# Patient Record
Sex: FEMALE | Race: WHITE | HISPANIC OR LATINO
[De-identification: names, ages, dates, MRNs, and addresses within clinical notes are randomized per-mention and may not be internally consistent; named-entity substitution may affect disease eponyms.]

---

## 2018-12-11 ENCOUNTER — RESULT REVIEW (OUTPATIENT)
Age: 63
End: 2018-12-11

## 2019-11-07 ENCOUNTER — INPATIENT (INPATIENT)
Facility: HOSPITAL | Age: 64
LOS: 2 days | Discharge: HOME | DRG: 203 | End: 2019-11-10
Attending: INTERNAL MEDICINE | Admitting: INTERNAL MEDICINE
Payer: COMMERCIAL

## 2019-11-07 VITALS
DIASTOLIC BLOOD PRESSURE: 100 MMHG | OXYGEN SATURATION: 99 % | SYSTOLIC BLOOD PRESSURE: 176 MMHG | HEART RATE: 74 BPM | HEIGHT: 60 IN | RESPIRATION RATE: 15 BRPM | TEMPERATURE: 97 F | WEIGHT: 162.92 LBS

## 2019-11-07 DIAGNOSIS — I10 ESSENTIAL (PRIMARY) HYPERTENSION: ICD-10-CM

## 2019-11-07 DIAGNOSIS — J45.901 UNSPECIFIED ASTHMA WITH (ACUTE) EXACERBATION: ICD-10-CM

## 2019-11-07 DIAGNOSIS — K21.9 GASTRO-ESOPHAGEAL REFLUX DISEASE WITHOUT ESOPHAGITIS: ICD-10-CM

## 2019-11-07 DIAGNOSIS — Z29.9 ENCOUNTER FOR PROPHYLACTIC MEASURES, UNSPECIFIED: ICD-10-CM

## 2019-11-07 DIAGNOSIS — E03.9 HYPOTHYROIDISM, UNSPECIFIED: ICD-10-CM

## 2019-11-07 LAB
ALBUMIN SERPL ELPH-MCNC: 4.2 G/DL — SIGNIFICANT CHANGE UP (ref 3.3–5)
ALP SERPL-CCNC: 85 U/L — SIGNIFICANT CHANGE UP (ref 40–120)
ALT FLD-CCNC: 36 U/L — SIGNIFICANT CHANGE UP (ref 12–78)
ANION GAP SERPL CALC-SCNC: 6 MMOL/L — SIGNIFICANT CHANGE UP (ref 5–17)
AST SERPL-CCNC: 26 U/L — SIGNIFICANT CHANGE UP (ref 15–37)
BASOPHILS # BLD AUTO: 0.06 K/UL — SIGNIFICANT CHANGE UP (ref 0–0.2)
BASOPHILS NFR BLD AUTO: 0.6 % — SIGNIFICANT CHANGE UP (ref 0–2)
BILIRUB SERPL-MCNC: 0.3 MG/DL — SIGNIFICANT CHANGE UP (ref 0.2–1.2)
BUN SERPL-MCNC: 19 MG/DL — SIGNIFICANT CHANGE UP (ref 7–23)
CALCIUM SERPL-MCNC: 9.4 MG/DL — SIGNIFICANT CHANGE UP (ref 8.5–10.1)
CHLORIDE SERPL-SCNC: 105 MMOL/L — SIGNIFICANT CHANGE UP (ref 96–108)
CO2 SERPL-SCNC: 28 MMOL/L — SIGNIFICANT CHANGE UP (ref 22–31)
CREAT SERPL-MCNC: 0.93 MG/DL — SIGNIFICANT CHANGE UP (ref 0.5–1.3)
EOSINOPHIL # BLD AUTO: 0.08 K/UL — SIGNIFICANT CHANGE UP (ref 0–0.5)
EOSINOPHIL NFR BLD AUTO: 0.8 % — SIGNIFICANT CHANGE UP (ref 0–6)
GLUCOSE SERPL-MCNC: 94 MG/DL — SIGNIFICANT CHANGE UP (ref 70–99)
HCT VFR BLD CALC: 39.4 % — SIGNIFICANT CHANGE UP (ref 34.5–45)
HGB BLD-MCNC: 12.9 G/DL — SIGNIFICANT CHANGE UP (ref 11.5–15.5)
IMM GRANULOCYTES NFR BLD AUTO: 0.4 % — SIGNIFICANT CHANGE UP (ref 0–1.5)
LYMPHOCYTES # BLD AUTO: 2.82 K/UL — SIGNIFICANT CHANGE UP (ref 1–3.3)
LYMPHOCYTES # BLD AUTO: 29 % — SIGNIFICANT CHANGE UP (ref 13–44)
MCHC RBC-ENTMCNC: 30.1 PG — SIGNIFICANT CHANGE UP (ref 27–34)
MCHC RBC-ENTMCNC: 32.7 GM/DL — SIGNIFICANT CHANGE UP (ref 32–36)
MCV RBC AUTO: 92.1 FL — SIGNIFICANT CHANGE UP (ref 80–100)
MONOCYTES # BLD AUTO: 0.76 K/UL — SIGNIFICANT CHANGE UP (ref 0–0.9)
MONOCYTES NFR BLD AUTO: 7.8 % — SIGNIFICANT CHANGE UP (ref 2–14)
NEUTROPHILS # BLD AUTO: 5.95 K/UL — SIGNIFICANT CHANGE UP (ref 1.8–7.4)
NEUTROPHILS NFR BLD AUTO: 61.4 % — SIGNIFICANT CHANGE UP (ref 43–77)
NRBC # BLD: 0 /100 WBCS — SIGNIFICANT CHANGE UP (ref 0–0)
PLATELET # BLD AUTO: 313 K/UL — SIGNIFICANT CHANGE UP (ref 150–400)
POTASSIUM SERPL-MCNC: 3.7 MMOL/L — SIGNIFICANT CHANGE UP (ref 3.5–5.3)
POTASSIUM SERPL-SCNC: 3.7 MMOL/L — SIGNIFICANT CHANGE UP (ref 3.5–5.3)
PROT SERPL-MCNC: 8.4 G/DL — HIGH (ref 6–8.3)
RBC # BLD: 4.28 M/UL — SIGNIFICANT CHANGE UP (ref 3.8–5.2)
RBC # FLD: 12.1 % — SIGNIFICANT CHANGE UP (ref 10.3–14.5)
SODIUM SERPL-SCNC: 139 MMOL/L — SIGNIFICANT CHANGE UP (ref 135–145)
WBC # BLD: 9.71 K/UL — SIGNIFICANT CHANGE UP (ref 3.8–10.5)
WBC # FLD AUTO: 9.71 K/UL — SIGNIFICANT CHANGE UP (ref 3.8–10.5)

## 2019-11-07 PROCEDURE — 71046 X-RAY EXAM CHEST 2 VIEWS: CPT | Mod: 26

## 2019-11-07 PROCEDURE — 93010 ELECTROCARDIOGRAM REPORT: CPT

## 2019-11-07 PROCEDURE — 99285 EMERGENCY DEPT VISIT HI MDM: CPT

## 2019-11-07 RX ORDER — ENOXAPARIN SODIUM 100 MG/ML
40 INJECTION SUBCUTANEOUS DAILY
Refills: 0 | Status: DISCONTINUED | OUTPATIENT
Start: 2019-11-07 | End: 2019-11-10

## 2019-11-07 RX ORDER — HYDROCHLOROTHIAZIDE 25 MG
12.5 TABLET ORAL DAILY
Refills: 0 | Status: DISCONTINUED | OUTPATIENT
Start: 2019-11-07 | End: 2019-11-10

## 2019-11-07 RX ORDER — BUDESONIDE AND FORMOTEROL FUMARATE DIHYDRATE 160; 4.5 UG/1; UG/1
2 AEROSOL RESPIRATORY (INHALATION)
Refills: 0 | Status: DISCONTINUED | OUTPATIENT
Start: 2019-11-07 | End: 2019-11-10

## 2019-11-07 RX ORDER — MONTELUKAST 4 MG/1
1 TABLET, CHEWABLE ORAL
Qty: 0 | Refills: 0 | DISCHARGE

## 2019-11-07 RX ORDER — ALPRAZOLAM 0.25 MG
0 TABLET ORAL
Qty: 0 | Refills: 0 | DISCHARGE

## 2019-11-07 RX ORDER — MAGNESIUM SULFATE 500 MG/ML
2 VIAL (ML) INJECTION ONCE
Refills: 0 | Status: COMPLETED | OUTPATIENT
Start: 2019-11-07 | End: 2019-11-07

## 2019-11-07 RX ORDER — BUDESONIDE AND FORMOTEROL FUMARATE DIHYDRATE 160; 4.5 UG/1; UG/1
2 AEROSOL RESPIRATORY (INHALATION)
Qty: 0 | Refills: 0 | DISCHARGE

## 2019-11-07 RX ORDER — SODIUM CHLORIDE 9 MG/ML
1000 INJECTION INTRAMUSCULAR; INTRAVENOUS; SUBCUTANEOUS ONCE
Refills: 0 | Status: COMPLETED | OUTPATIENT
Start: 2019-11-07 | End: 2019-11-07

## 2019-11-07 RX ORDER — IPRATROPIUM/ALBUTEROL SULFATE 18-103MCG
3 AEROSOL WITH ADAPTER (GRAM) INHALATION ONCE
Refills: 0 | Status: COMPLETED | OUTPATIENT
Start: 2019-11-07 | End: 2019-11-07

## 2019-11-07 RX ORDER — MONTELUKAST 4 MG/1
10 TABLET, CHEWABLE ORAL DAILY
Refills: 0 | Status: DISCONTINUED | OUTPATIENT
Start: 2019-11-07 | End: 2019-11-10

## 2019-11-07 RX ORDER — LEVOTHYROXINE SODIUM 125 MCG
75 TABLET ORAL DAILY
Refills: 0 | Status: DISCONTINUED | OUTPATIENT
Start: 2019-11-07 | End: 2019-11-10

## 2019-11-07 RX ORDER — LISINOPRIL 2.5 MG/1
20 TABLET ORAL DAILY
Refills: 0 | Status: DISCONTINUED | OUTPATIENT
Start: 2019-11-07 | End: 2019-11-10

## 2019-11-07 RX ORDER — ALBUTEROL 90 UG/1
1 AEROSOL, METERED ORAL THREE TIMES A DAY
Refills: 0 | Status: DISCONTINUED | OUTPATIENT
Start: 2019-11-07 | End: 2019-11-10

## 2019-11-07 RX ORDER — BENAZEPRIL HYDROCHLORIDE 40 MG/1
1 TABLET ORAL
Qty: 0 | Refills: 0 | DISCHARGE

## 2019-11-07 RX ORDER — HYDROCHLOROTHIAZIDE 25 MG
12.5 TABLET ORAL DAILY
Refills: 0 | Status: DISCONTINUED | OUTPATIENT
Start: 2019-11-07 | End: 2019-11-07

## 2019-11-07 RX ADMIN — SODIUM CHLORIDE 1000 MILLILITER(S): 9 INJECTION INTRAMUSCULAR; INTRAVENOUS; SUBCUTANEOUS at 17:20

## 2019-11-07 RX ADMIN — Medication 104 MILLIGRAM(S): at 15:46

## 2019-11-07 RX ADMIN — Medication 3 MILLILITER(S): at 16:20

## 2019-11-07 RX ADMIN — SODIUM CHLORIDE 1000 MILLILITER(S): 9 INJECTION INTRAMUSCULAR; INTRAVENOUS; SUBCUTANEOUS at 15:45

## 2019-11-07 RX ADMIN — Medication 3 MILLILITER(S): at 16:40

## 2019-11-07 RX ADMIN — Medication 12.5 MILLIGRAM(S): at 19:51

## 2019-11-07 RX ADMIN — Medication 3 MILLILITER(S): at 15:45

## 2019-11-07 RX ADMIN — Medication 125 MILLIGRAM(S): at 17:20

## 2019-11-07 RX ADMIN — Medication 50 GRAM(S): at 17:35

## 2019-11-07 NOTE — ED PROVIDER NOTE - ATTENDING CONTRIBUTION TO CARE
63yo F with ho asthma with 2 weeks worsening asthma exacerbation. worse in last few days. has been taking nebulizers with some improvement but still feels very sob. was seen by pmd mojgan today given tx and told to go to ED  on exam diminished breath sounds, very tight, end dennis wheezing  no obv distress  will treat asthma exacerbation, nebs, steroids, reassess

## 2019-11-07 NOTE — H&P ADULT - PROBLEM SELECTOR PLAN 1
Admit to medicine  Pt presents with GONG secondary to acute asthma exacerbation.   Pt restarted on home dose symbicort, Proair, montelukast  Albuterol q6 while awake, nebs q3 PRN  Solumedrol IV Q6 for management of symptoms   F/u AM CBC, BMP

## 2019-11-07 NOTE — ED PROVIDER NOTE - CLINICAL SUMMARY MEDICAL DECISION MAKING FREE TEXT BOX
63 y/o female hx asthma presents for exacerbation not responding to home tx. sent in by pmd. steroids, duonebs, mg+ and likely admit

## 2019-11-07 NOTE — H&P ADULT - NSICDXPASTMEDICALHX_GEN_ALL_CORE_FT
PAST MEDICAL HISTORY:  Asthma     GERD (gastroesophageal reflux disease)     Hypothyroid PAST MEDICAL HISTORY:  Asthma     GERD (gastroesophageal reflux disease)     HTN (hypertension)     Hypothyroid

## 2019-11-07 NOTE — H&P ADULT - NSHPPHYSICALEXAM_GEN_ALL_CORE
T(C): 36.2 (11-07-19 @ 14:08), Max: 36.2 (11-07-19 @ 14:08)  HR: 74 (11-07-19 @ 14:08) (74 - 74)  BP: 176/100 (11-07-19 @ 14:08) (176/100 - 176/100)  RR: 15 (11-07-19 @ 14:08) (15 - 15)  SpO2: 99% (11-07-19 @ 14:08) (99% - 99%)    GENERAL: patient appears well, no acute distress, appropriate  EYES: sclera clear, no exudates, PERRLA  ENMT: oropharynx clear without erythema, no exudates, moist mucous membranes  NECK: supple, soft, no thyromegaly noted  LUNGS:  clear to auscultation,  no rales, wheezing or rhonchi appreciated  HEART: S1/S2, regular rate and rhythm, no murmurs noted, no lower extremity edema, pulses  GASTROINTESTINAL: abdomen is soft, nontender, nondistended, normoactive bowel sounds, no palpable masses  INTEGUMENT: good skin turgor, warm, dry and intact  MUSCULOSKELETAL: no clubbing or cyanosis, no obvious deformity  NEUROLOGIC: awake, alert, oriented x3, strength no obvious sensory deficits  PSYCHIATRIC: mood is good, affect is congruent, linear and logical thought process  HEME/LYMPH:  no obvious ecchymosis or petechiae T(C): 36.2 (11-07-19 @ 14:08), Max: 36.2 (11-07-19 @ 14:08)  HR: 74 (11-07-19 @ 14:08) (74 - 74)  BP: 176/100 (11-07-19 @ 14:08) (176/100 - 176/100)  RR: 15 (11-07-19 @ 14:08) (15 - 15)  SpO2: 99% (11-07-19 @ 14:08) (99% - 99%)    GENERAL: patient appears well, no acute distress, appropriate  EYES: sclera clear, no exudates, PERRLA  ENMT: oropharynx clear without erythema, no exudates, moist mucous membranes  LUNGS:  clear to auscultation,  no rales, wheezing or rhonchi appreciated  HEART: S1/S2, regular rate and rhythm, no murmurs noted, no lower extremity edema  GASTROINTESTINAL: abdomen is soft, nontender, nondistended, normoactive bowel sounds, no palpable masses  INTEGUMENT: good skin turgor, warm, dry and intact  MUSCULOSKELETAL: no clubbing or cyanosis, no obvious deformity  NEUROLOGIC: awake, alert, oriented x3, strength 5/5 in all extremities, no obvious sensory deficits

## 2019-11-07 NOTE — ED ADULT NURSE NOTE - OBJECTIVE STATEMENT
Pt received in bed alert and oriented and resting with the c/o SOB and wheezing that started on 2 weeks but got progressively worse. As per Md's orders IV sylvia placed blood specimen obtained and sent to the lab and meds given and tolerated well. Pt stable with IVF infusing well and nursing care ongoing and safety maintained.

## 2019-11-07 NOTE — ED PROVIDER NOTE - PROGRESS NOTE DETAILS
patient reassessed following 3 duonebs, does not feel better, still not moving air well on auscultation. Mg and admit - Sherry Goel PA-C

## 2019-11-07 NOTE — H&P ADULT - NSICDXFAMILYHX_GEN_ALL_CORE_FT
FAMILY HISTORY:  Sibling  Still living? Unknown  Family history of asthma, Age at diagnosis: Age Unknown  Family history of atrial fibrillation, Age at diagnosis: Age Unknown  Family history of stomach cancer, Age at diagnosis: Age Unknown

## 2019-11-07 NOTE — H&P ADULT - NSHPREVIEWOFSYSTEMS_GEN_ALL_CORE
CONSTITUTIONAL: denies fever, chills, fatigue, weakness  HEENT: denies blurred vision, sore throat  SKIN: denies new lesions, rash  CARDIOVASCULAR: denies chest pain, chest pressure, palpitations  RESPIRATORY: denies shortness of breath, sputum production  GASTROINTESTINAL: denies nausea, vomiting, diarrhea, abdominal pain  GENITOURINARY: denies dysuria, discharge  NEUROLOGICAL: denies numbness, headache, focal weakness  MUSCULOSKELETAL: denies new joint pain, muscle aches  HEMATOLOGIC: denies gross bleeding, bruising  LYMPHATICS: denies enlarged lymph nodes, extremity swelling  PSYCHIATRIC: denies recent changes in anxiety, depression  ENDOCRINOLOGIC: denies sweating, cold or heat intolerance CONSTITUTIONAL: denies fever, chills, fatigue, weakness  HEENT: denies blurred vision, sore throat  SKIN: denies new lesions, rash  CARDIOVASCULAR: denies chest pain, chest pressure, palpitations  RESPIRATORY: admits to shortness of breath, denies sputum production  GASTROINTESTINAL: denies nausea, vomiting, diarrhea, abdominal pain  GENITOURINARY: denies dysuria, discharge  NEUROLOGICAL: denies numbness, headache, focal weakness  MUSCULOSKELETAL: denies new joint pain, muscle aches  HEMATOLOGIC: denies gross bleeding, bruising  LYMPHATICS: denies enlarged lymph nodes, extremity swelling  PSYCHIATRIC: denies recent changes in anxiety, depression  ENDOCRINOLOGIC: denies sweating, cold or heat intolerance

## 2019-11-07 NOTE — H&P ADULT - ASSESSMENT
65 y/o female PMHx asthma, GERD, hypothyroidism presents to the ED for asthma exacerbation x 2 weeks. 63 y/o female PMHx asthma, GERD, hypothyroidism, HTN  presents to the ED for asthma exacerbation x 2 weeks.

## 2019-11-07 NOTE — ED PROVIDER NOTE - OBJECTIVE STATEMENT
63 y/o female hx asthma presents to the ED for asthma exacerbation x 2 weeks. using symbicort and rescue inhaler without relief. no fever/chills/productive cough. denies chest pain aside from her typical tightness associated with asthma. many hospitalization for asthma but never been intubated. no sick contacts.

## 2019-11-07 NOTE — H&P ADULT - NSHPSOCIALHISTORY_GEN_ALL_CORE
Lives at home with  and daughter  Never smoker, never drinker, no drug use  ambulates without assistance

## 2019-11-08 LAB
ANION GAP SERPL CALC-SCNC: 6 MMOL/L — SIGNIFICANT CHANGE UP (ref 5–17)
BUN SERPL-MCNC: 17 MG/DL — SIGNIFICANT CHANGE UP (ref 7–23)
CALCIUM SERPL-MCNC: 9 MG/DL — SIGNIFICANT CHANGE UP (ref 8.5–10.1)
CHLORIDE SERPL-SCNC: 107 MMOL/L — SIGNIFICANT CHANGE UP (ref 96–108)
CO2 SERPL-SCNC: 26 MMOL/L — SIGNIFICANT CHANGE UP (ref 22–31)
CREAT SERPL-MCNC: 0.83 MG/DL — SIGNIFICANT CHANGE UP (ref 0.5–1.3)
CRP SERPL-MCNC: 0.23 MG/DL — SIGNIFICANT CHANGE UP (ref 0–0.4)
EOSINOPHIL # BLD: 10 /UL — LOW (ref 50–350)
GLUCOSE SERPL-MCNC: 121 MG/DL — HIGH (ref 70–99)
HBA1C BLD-MCNC: 6 % — HIGH (ref 4–5.6)
HCT VFR BLD CALC: 36.2 % — SIGNIFICANT CHANGE UP (ref 34.5–45)
HCV AB S/CO SERPL IA: 0.13 S/CO — SIGNIFICANT CHANGE UP (ref 0–0.99)
HCV AB SERPL-IMP: SIGNIFICANT CHANGE UP
HGB BLD-MCNC: 12.2 G/DL — SIGNIFICANT CHANGE UP (ref 11.5–15.5)
MCHC RBC-ENTMCNC: 30.7 PG — SIGNIFICANT CHANGE UP (ref 27–34)
MCHC RBC-ENTMCNC: 33.7 GM/DL — SIGNIFICANT CHANGE UP (ref 32–36)
MCV RBC AUTO: 91 FL — SIGNIFICANT CHANGE UP (ref 80–100)
NRBC # BLD: 0 /100 WBCS — SIGNIFICANT CHANGE UP (ref 0–0)
NT-PROBNP SERPL-SCNC: 133 PG/ML — HIGH (ref 0–125)
PLATELET # BLD AUTO: 334 K/UL — SIGNIFICANT CHANGE UP (ref 150–400)
POTASSIUM SERPL-MCNC: 3.9 MMOL/L — SIGNIFICANT CHANGE UP (ref 3.5–5.3)
POTASSIUM SERPL-SCNC: 3.9 MMOL/L — SIGNIFICANT CHANGE UP (ref 3.5–5.3)
RBC # BLD: 3.98 M/UL — SIGNIFICANT CHANGE UP (ref 3.8–5.2)
RBC # FLD: 12.2 % — SIGNIFICANT CHANGE UP (ref 10.3–14.5)
SODIUM SERPL-SCNC: 139 MMOL/L — SIGNIFICANT CHANGE UP (ref 135–145)
TSH SERPL-MCNC: 0.74 UIU/ML — SIGNIFICANT CHANGE UP (ref 0.36–3.74)
WBC # BLD: 20 K/UL — HIGH (ref 3.8–10.5)
WBC # FLD AUTO: 20 K/UL — HIGH (ref 3.8–10.5)

## 2019-11-08 RX ADMIN — ENOXAPARIN SODIUM 40 MILLIGRAM(S): 100 INJECTION SUBCUTANEOUS at 12:51

## 2019-11-08 RX ADMIN — MONTELUKAST 10 MILLIGRAM(S): 4 TABLET, CHEWABLE ORAL at 21:40

## 2019-11-08 RX ADMIN — LISINOPRIL 20 MILLIGRAM(S): 2.5 TABLET ORAL at 05:16

## 2019-11-08 RX ADMIN — BUDESONIDE AND FORMOTEROL FUMARATE DIHYDRATE 2 PUFF(S): 160; 4.5 AEROSOL RESPIRATORY (INHALATION) at 17:52

## 2019-11-08 RX ADMIN — BUDESONIDE AND FORMOTEROL FUMARATE DIHYDRATE 2 PUFF(S): 160; 4.5 AEROSOL RESPIRATORY (INHALATION) at 06:53

## 2019-11-08 RX ADMIN — Medication 20 MILLIGRAM(S): at 12:51

## 2019-11-08 RX ADMIN — Medication 75 MICROGRAM(S): at 05:16

## 2019-11-08 NOTE — CONSULT NOTE ADULT - SUBJECTIVE AND OBJECTIVE BOX
Date/Time Patient Seen:  		  Referring MD:   Data Reviewed	       Patient is a 64y old  Female who presents with a chief complaint of asthma exacerbation (07 Nov 2019 17:22)      Subjective/HPI  in bed, seen and examined, vs and meds reviewed, labs reviewed,   H and P reviewed  er provider note reviewed  asthma  lifelong asthma with environmental allergies  spoke with pt and dtrs  she used to see [facility]    H&P Adult [Charted Location: [place] [id number]] [Authored: 07-Nov-2019 17:22]- for Visit: [id number], In Progress, Revised, Signed w/additional Signatures Pending, General    History and Physical:   Outpatient Providers	Dr. Haynes (PMD)  Dr. Mullen (cards)  Dr Perlman (pulm)  Dr. Perlman (endocrine)       History of Present Illness:  Reason for Admission: asthma exacerbation  History of Present Illness:   65 y/o female PMHx asthma, GERD, HTN, hypothyroidism presents to the ED for asthma exacerbation x 2 weeks. Pt states that her asthma symptoms have been progressively worsening for the past two weeks. She denies cough, but notes increased GONG over this time. Pt decided to come in today as she could barely walk while at work due to her SOB. Pt has been using symbicort and her rescue inhaler without relief. Pt notes that cold weather and stress trigger her asthma symptoms, and that she has been more stressed than usual. Pt has chest tightness today, but notes that this is no different than her typical chest tightness she gets with asthma. Pt denies fever/chills/productive cough, CP, abd pain, bowel changes, urine changes, muscle weakness, numbness, tingling.  Pt denies any sick contacts or other complaints today.     In ED  Vitals /100, RR 15, HR 74, T 97.2  EKG NSR, LVH, nonspecific T wave abnormality, prolonged QT (487)  Labs: WNL  Imaging  CXR: clear lungs    PAST SURGICAL HISTORY:  S/P cholecystectomy 1996    S/P tubal ligation.     FAMILY HISTORY:  Sibling  Still living? Unknown  Family history of asthma, Age at diagnosis: Age Unknown  Family history of atrial fibrillation, Age at diagnosis: Age Unknown  Family history of stomach cancer, Age at diagnosis: Age Unknown.     Social History:  Social History (marital status, living situation, occupation, tobacco use, alcohol and drug use, and sexual history): Lives at home with  and daughter  	Never smoker, never drinker, no drug use  ambulates without assistance     Tobacco Screening:  · Core Measure Site	Yes  · Has the patient used tobacco in the past 30 days?	No    Risk Assessment:    Present on Admission:  Deep Venous Thrombosis	no  Pulmonary Embolus	no     Heart Failure:  Does this patient have a history of or has been diagnosed with heart failure? no.       PAST MEDICAL & SURGICAL HISTORY:  HTN (hypertension)  GERD (gastroesophageal reflux disease)  Asthma  Hypothyroid  S/P tubal ligation  S/P cholecystectomy: 1996        Medication list         MEDICATIONS  (STANDING):  budesonide  80 MICROgram(s)/formoterol 4.5 MICROgram(s) Inhaler 2 Puff(s) Inhalation two times a day  enoxaparin Injectable 40 milliGRAM(s) SubCutaneous daily  hydrochlorothiazide 12.5 milliGRAM(s) Oral daily  levothyroxine 75 MICROGram(s) Oral daily  lisinopril 20 milliGRAM(s) Oral daily  montelukast 10 milliGRAM(s) Oral daily  predniSONE   Tablet 20 milliGRAM(s) Oral daily    MEDICATIONS  (PRN):  ALBUTerol    90 MICROgram(s) HFA Inhaler 1 Puff(s) Inhalation three times a day PRN Bronchospasm  guaiFENesin   Syrup  (Sugar-Free) 200 milliGRAM(s) Oral every 6 hours PRN Cough         Vitals log        ICU Vital Signs Last 24 Hrs  T(C): 36.5 (08 Nov 2019 04:48), Max: 36.8 (07 Nov 2019 20:33)  T(F): 97.7 (08 Nov 2019 04:48), Max: 98.2 (07 Nov 2019 20:33)  HR: 67 (08 Nov 2019 04:48) (67 - 106)  BP: 131/73 (08 Nov 2019 04:48) (131/73 - 176/100)  BP(mean): --  ABP: --  ABP(mean): --  RR: 17 (08 Nov 2019 04:48) (15 - 20)  SpO2: 97% (08 Nov 2019 04:48) (95% - 99%)           Input and Output:  I&O's Detail      Lab Data                        12.2   20.00 )-----------( 334      ( 08 Nov 2019 08:31 )             36.2     11-08    139  |  107  |  17  ----------------------------<  121<H>  3.9   |  26  |  0.83    Ca    9.0      08 Nov 2019 08:31    TPro  8.4<H>  /  Alb  4.2  /  TBili  0.3  /  DBili  x   /  AST  26  /  ALT  36  /  AlkPhos  85  11-07            Review of Systems	  anxious      Objective     Physical Examination    heart s1s2  lung dec BS  abd soft  extr no gross edema  room air sat at rest  98 pct      Pertinent Lab findings & Imaging      Nazanin:  NO   Adequate UO     I&O's Detail           Discussed with:     Cultures:	        Radiology    cxr  napd

## 2019-11-08 NOTE — PROGRESS NOTE ADULT - SUBJECTIVE AND OBJECTIVE BOX
Patient is a 64y old  Female who presents with a chief complaint of asthma exacerbation (07 Nov 2019 17:22)      INTERVAL HPI/OVERNIGHT EVENTS: Patient seen and examined at bedside this AM. She is resting in her chair comfortably; saturating well on room air, in no acute distress. Patient is still complaining of some dyspnea on exertion this AM; she states that this morning when she got up to use the restroom and change she felt short of breath. However, she states that her current medication regimen has been helping her. Denies any fevers, chills, chest pain, palpitations, abdominal pain, N/V/D/C.     T(C): 36.5 (11-08-19 @ 04:48), Max: 36.8 (11-07-19 @ 20:33)  HR: 67 (11-08-19 @ 04:48) (67 - 106)  BP: 131/73 (11-08-19 @ 04:48) (131/73 - 176/100)  RR: 17 (11-08-19 @ 04:48) (15 - 20)  SpO2: 97% (11-08-19 @ 04:48) (95% - 99%)      I&O's Summary      LABS:                        12.2   20.00 )-----------( 334      ( 08 Nov 2019 08:31 )             36.2     11-08    139  |  107  |  17  ----------------------------<  121<H>  3.9   |  26  |  0.83    Ca    9.0      08 Nov 2019 08:31    TPro  8.4<H>  /  Alb  4.2  /  TBili  0.3  /  DBili  x   /  AST  26  /  ALT  36  /  AlkPhos  85  11-07      CAPILLARY BLOOD GLUCOSE                 MEDICATIONS  (STANDING):  budesonide  80 MICROgram(s)/formoterol 4.5 MICROgram(s) Inhaler 2 Puff(s) Inhalation two times a day  enoxaparin Injectable 40 milliGRAM(s) SubCutaneous daily  hydrochlorothiazide 12.5 milliGRAM(s) Oral daily  levothyroxine 75 MICROGram(s) Oral daily  lisinopril 20 milliGRAM(s) Oral daily  montelukast 10 milliGRAM(s) Oral daily  predniSONE   Tablet 20 milliGRAM(s) Oral daily    MEDICATIONS  (PRN):  ALBUTerol    90 MICROgram(s) HFA Inhaler 1 Puff(s) Inhalation three times a day PRN Bronchospasm  guaiFENesin   Syrup  (Sugar-Free) 200 milliGRAM(s) Oral every 6 hours PRN Cough      REVIEW OF SYSTEMS:  CONSTITUTIONAL: No fever, weight loss, or fatigue  EYES: No eye pain, visual disturbances, or discharge  ENMT: No difficulty hearing, tinnitus, vertigo; No sinus or throat pain  NECK: No pain or stiffness  RESPIRATORY: No cough, wheezing, chills or hemoptysis; Admits to shortness of breath with exertion   CARDIOVASCULAR: No chest pain, palpitations, dizziness, or leg swelling  GASTROINTESTINAL: No abdominal or epigastric pain. No nausea, vomiting, or hematemesis; No diarrhea or constipation; No melena or hematochezia  GENITOURINARY: No dysuria, frequency, hematuria, or incontinence  NEUROLOGICAL: No headaches, memory loss, loss of strength, numbness, or tremors  SKIN: No itching, burning, rashes, or lesions   LYMPH NODES: No enlarged glands  ENDOCRINE: No heat or cold intolerance; No hair loss  MUSCULOSKELETAL: No joint pain or swelling; No muscle, back, or extremity pain    RADIOLOGY & ADDITIONAL TESTS:    Imaging Personally Reviewed:  [ x] YES  [ ] NO    Consultant(s) Notes Reviewed:  [ x] YES  [ ] NO    PHYSICAL EXAM:  GENERAL: NAD, well-groomed, well-developed  HEAD: Atraumatic, Normocephalic  EYES: EOMI, PERRL, conjunctiva and sclera clear  ENMT: No tonsillar erythema, exudates, or enlargement; Moist mucous membranes, Good dentition  NECK: Supple, No JVD  NERVOUS SYSTEM: Alert & Oriented X3, Good concentration; Good motor strength in B/L upper and lower extremities  CHEST/LUNG: Clear to percussion bilaterally; No rales, rhonchi, wheezing, or rubs  HEART: Regular rate and rhythm; No murmurs, rubs, or gallops  ABDOMEN: Soft, Nontender, Nondistended; Bowel sounds present  EXTREMITIES: 2+ Peripheral Pulses, No clubbing, cyanosis, or edema  LYMPH: No lymphadenopathy noted  SKIN: Warm, dry, well-perfused    Care Discussed with Consultants/Other Providers [x ] YES  [ ] NO

## 2019-11-08 NOTE — CONSULT NOTE ADULT - PROBLEM SELECTOR RECOMMENDATION 9
asthma - asthma ex - environmental allergies - obesity - anxious -   pt reports being under a lot of emotional stress - death in the family -   asthma that is poorly controlled -   cont Symbicort and Singulair - cont Proventil PRN - will add Prednisone low dose   robitussin PRN for cough  will check IgE, CRP, TSH, ProBNP, Hgba1c -   tolerating room air at rest - CXR napd - labs reviewed  spoke with dtrs and patient  she will need follow up on discharge - with her Pulm MD - [facility] - in [place] -   will follow

## 2019-11-08 NOTE — PROGRESS NOTE ADULT - PROBLEM SELECTOR PLAN 1
Pt presents with GONG secondary to acute asthma exacerbation, patient still reports some GONG this AM   -Leukocytosis (20.00) this AM; likely 2/2 steroids  -c/w Symbicort, Proair, montelukast  Albuterol q6 while awake, nebs q3 PRN  continue with prednisone 20 mg daily   F/u AM CBC, BMP  -Pulm (Dr Willett) consulted; f/u recs Pt presents with GONG secondary to acute asthma exacerbation, patient still reports some GONG this AM   -Leukocytosis (20.00) this AM; likely 2/2 steroids  -c/w Symbicort, Proair, montelukast  Albuterol q6 while awake, nebs q3 PRN  continue with prednisone 20 mg daily   F/u AM CBC, BMP, IgE, CRP, TSH, ProBNP, Hgba1c  -Pulm (Dr Kramer) consulted; f/u recs

## 2019-11-08 NOTE — PROGRESS NOTE ADULT - ASSESSMENT
65 y/o female PMHx asthma, GERD, hypothyroidism, HTN  presents to the ED for asthma exacerbation x 2 weeks.

## 2019-11-08 NOTE — PROGRESS NOTE ADULT - PROBLEM SELECTOR PLAN 2
Pt hypertensive on admission 176/100, BP wnl this morning   -c/w HCTZ 12.5 QD  -c/w Lisinopril 20 mg daily

## 2019-11-09 ENCOUNTER — TRANSCRIPTION ENCOUNTER (OUTPATIENT)
Age: 64
End: 2019-11-09

## 2019-11-09 DIAGNOSIS — Z71.89 OTHER SPECIFIED COUNSELING: ICD-10-CM

## 2019-11-09 LAB
ANION GAP SERPL CALC-SCNC: 4 MMOL/L — LOW (ref 5–17)
BUN SERPL-MCNC: 18 MG/DL — SIGNIFICANT CHANGE UP (ref 7–23)
CALCIUM SERPL-MCNC: 8.9 MG/DL — SIGNIFICANT CHANGE UP (ref 8.5–10.1)
CHLORIDE SERPL-SCNC: 107 MMOL/L — SIGNIFICANT CHANGE UP (ref 96–108)
CHOLEST SERPL-MCNC: 171 MG/DL — SIGNIFICANT CHANGE UP (ref 10–199)
CO2 SERPL-SCNC: 30 MMOL/L — SIGNIFICANT CHANGE UP (ref 22–31)
CREAT SERPL-MCNC: 0.85 MG/DL — SIGNIFICANT CHANGE UP (ref 0.5–1.3)
GLUCOSE SERPL-MCNC: 98 MG/DL — SIGNIFICANT CHANGE UP (ref 70–99)
HCT VFR BLD CALC: 37.6 % — SIGNIFICANT CHANGE UP (ref 34.5–45)
HDLC SERPL-MCNC: 54 MG/DL — SIGNIFICANT CHANGE UP
HGB BLD-MCNC: 12.1 G/DL — SIGNIFICANT CHANGE UP (ref 11.5–15.5)
LIPID PNL WITH DIRECT LDL SERPL: 100 MG/DL — SIGNIFICANT CHANGE UP
MCHC RBC-ENTMCNC: 30 PG — SIGNIFICANT CHANGE UP (ref 27–34)
MCHC RBC-ENTMCNC: 32.2 GM/DL — SIGNIFICANT CHANGE UP (ref 32–36)
MCV RBC AUTO: 93.3 FL — SIGNIFICANT CHANGE UP (ref 80–100)
NRBC # BLD: 0 /100 WBCS — SIGNIFICANT CHANGE UP (ref 0–0)
PLATELET # BLD AUTO: 326 K/UL — SIGNIFICANT CHANGE UP (ref 150–400)
POTASSIUM SERPL-MCNC: 4.2 MMOL/L — SIGNIFICANT CHANGE UP (ref 3.5–5.3)
POTASSIUM SERPL-SCNC: 4.2 MMOL/L — SIGNIFICANT CHANGE UP (ref 3.5–5.3)
RBC # BLD: 4.03 M/UL — SIGNIFICANT CHANGE UP (ref 3.8–5.2)
RBC # FLD: 12.4 % — SIGNIFICANT CHANGE UP (ref 10.3–14.5)
SODIUM SERPL-SCNC: 141 MMOL/L — SIGNIFICANT CHANGE UP (ref 135–145)
TOTAL CHOLESTEROL/HDL RATIO MEASUREMENT: 3.2 RATIO — LOW (ref 3.3–7.1)
TRIGL SERPL-MCNC: 84 MG/DL — SIGNIFICANT CHANGE UP (ref 10–149)
WBC # BLD: 18.78 K/UL — HIGH (ref 3.8–10.5)
WBC # FLD AUTO: 18.78 K/UL — HIGH (ref 3.8–10.5)

## 2019-11-09 RX ADMIN — BUDESONIDE AND FORMOTEROL FUMARATE DIHYDRATE 2 PUFF(S): 160; 4.5 AEROSOL RESPIRATORY (INHALATION) at 07:08

## 2019-11-09 RX ADMIN — Medication 12.5 MILLIGRAM(S): at 06:03

## 2019-11-09 RX ADMIN — ALBUTEROL 1 PUFF(S): 90 AEROSOL, METERED ORAL at 22:46

## 2019-11-09 RX ADMIN — LISINOPRIL 20 MILLIGRAM(S): 2.5 TABLET ORAL at 06:03

## 2019-11-09 RX ADMIN — ENOXAPARIN SODIUM 40 MILLIGRAM(S): 100 INJECTION SUBCUTANEOUS at 11:27

## 2019-11-09 RX ADMIN — BUDESONIDE AND FORMOTEROL FUMARATE DIHYDRATE 2 PUFF(S): 160; 4.5 AEROSOL RESPIRATORY (INHALATION) at 19:14

## 2019-11-09 RX ADMIN — MONTELUKAST 10 MILLIGRAM(S): 4 TABLET, CHEWABLE ORAL at 22:46

## 2019-11-09 RX ADMIN — Medication 20 MILLIGRAM(S): at 06:03

## 2019-11-09 RX ADMIN — Medication 75 MICROGRAM(S): at 06:03

## 2019-11-09 RX ADMIN — ALBUTEROL 1 PUFF(S): 90 AEROSOL, METERED ORAL at 08:01

## 2019-11-09 NOTE — PROGRESS NOTE ADULT - PROBLEM SELECTOR PLAN 1
looks and feels better - on room air -   cont symbicort, nebs prn, prednisone short course, Singulair -   labs and markers and imaging reviewed -   ambulate  may shower  dc planning - likely for tomorrow am -   cvs rx regimen and BP control  will follow with [facility] for Pulm follow up - in [place] -   discussed with pt and dtrs -

## 2019-11-09 NOTE — PROGRESS NOTE ADULT - SUBJECTIVE AND OBJECTIVE BOX
Patient is a 64y old  Female who presents with a chief complaint of asthma exacerbation (09 Nov 2019 06:26)  breathing improving    INTERVAL HPI/OVERNIGHT EVENTS:  T(C): 36.6 (11-09-19 @ 05:19), Max: 37 (11-08-19 @ 20:58)  HR: 63 (11-09-19 @ 05:19) (63 - 89)  BP: 165/91 (11-09-19 @ 05:19) (142/77 - 165/91)  RR: 17 (11-09-19 @ 05:19) (17 - 17)  SpO2: 97% (11-09-19 @ 05:19) (95% - 99%)  Wt(kg): --  I&O's Summary      LABS:                        12.1   18.78 )-----------( 326      ( 09 Nov 2019 07:15 )             37.6     11-09    141  |  107  |  18  ----------------------------<  98  4.2   |  30  |  0.85    Ca    8.9      09 Nov 2019 07:15    TPro  8.4<H>  /  Alb  4.2  /  TBili  0.3  /  DBili  x   /  AST  26  /  ALT  36  /  AlkPhos  85  11-07        CAPILLARY BLOOD GLUCOSE                MEDICATIONS  (STANDING):  budesonide  80 MICROgram(s)/formoterol 4.5 MICROgram(s) Inhaler 2 Puff(s) Inhalation two times a day  enoxaparin Injectable 40 milliGRAM(s) SubCutaneous daily  hydrochlorothiazide 12.5 milliGRAM(s) Oral daily  levothyroxine 75 MICROGram(s) Oral daily  lisinopril 20 milliGRAM(s) Oral daily  montelukast 10 milliGRAM(s) Oral daily  predniSONE   Tablet 20 milliGRAM(s) Oral daily    MEDICATIONS  (PRN):  ALBUTerol    90 MICROgram(s) HFA Inhaler 1 Puff(s) Inhalation three times a day PRN Bronchospasm  guaiFENesin   Syrup  (Sugar-Free) 200 milliGRAM(s) Oral every 6 hours PRN Cough      REVIEW OF SYSTEMS:  CONSTITUTIONAL: No fever, weight loss, or fatigue  EYES: No eye pain, visual disturbances, or discharge  ENMT:  No difficulty hearing, tinnitus, vertigo; No sinus or throat pain  NECK: No pain or stiffness  RESPIRATORY: GONG, less wheeze  CARDIOVASCULAR: No chest pain, palpitations, dizziness, or leg swelling  GASTROINTESTINAL: No abdominal or epigastric pain. No nausea, vomiting, or hematemesis; No diarrhea or constipation. No melena or hematochezia.  GENITOURINARY: No dysuria, frequency, hematuria, or incontinence  NEUROLOGICAL: No headaches, memory loss, loss of strength, numbness, or tremors  SKIN: No itching, burning, rashes, or lesions   LYMPH NODES: No enlarged glands  ENDOCRINE: No heat or cold intolerance; No hair loss  MUSCULOSKELETAL: No joint pain or swelling; No muscle, back, or extremity pain  PSYCHIATRIC: No depression, anxiety, mood swings, or difficulty sleeping  HEME/LYMPH: No easy bruising, or bleeding gums  ALLERY AND IMMUNOLOGIC: No hives or eczema    RADIOLOGY & ADDITIONAL TESTS:    Imaging Personally Reviewed:  [ ] YES  [ ] NO    Consultant(s) Notes Reviewed:  [ ] YES  [ ] NO    PHYSICAL EXAM:  GENERAL: NAD, well-groomed, well-developed  HEAD:  Atraumatic, Normocephalic  EYES: EOMI, PERRLA, conjunctiva and sclera clear  ENMT: No tonsillar erythema, exudates, or enlargement; Moist mucous membranes, Good dentition, No lesions  NECK: Supple, No JVD, Normal thyroid  NERVOUS SYSTEM:  Alert & Oriented X3, Good concentration; Motor Strength 5/5 B/L upper and lower extremities; DTRs 2+ intact and symmetric  CHEST/LUNG: Clear to percussion bilaterally; No rales, rhonchi, wheezing, or rubs  HEART: Regular rate and rhythm; No murmurs, rubs, or gallops  ABDOMEN: Soft, Nontender, Nondistended; Bowel sounds present  EXTREMITIES:  2+ Peripheral Pulses, No clubbing, cyanosis, or edema  LYMPH: No lymphadenopathy noted  SKIN: No rashes or lesions    Care Discussed with Consultants/Other Providers [ ] YES  [ ] NO

## 2019-11-09 NOTE — PROGRESS NOTE ADULT - PROBLEM SELECTOR PLAN 1
Pt presents with GONG secondary to acute asthma exacerbation, patient still reports some GONG this AM   -Leukocytosis; likely 2/2 steroids  -c/w Symbicort, Proair, montelukast  Albuterol q6 while awake, nebs q3 PRN  continue with prednisone 20 mg daily   F/u AM CBC, BMP, IgE, CRP, TSH, ProBNP, Hgba1c  -Pulm (Dr Kramer) consulted; f/u recs  hopeful dc home tomorrow am

## 2019-11-09 NOTE — PROGRESS NOTE ADULT - PROBLEM SELECTOR PLAN 6
advance care planning and advance directives discussed.  pt to provide health care proxy forms w appointed health care agent

## 2019-11-09 NOTE — PROGRESS NOTE ADULT - SUBJECTIVE AND OBJECTIVE BOX
Date/Time Patient Seen:  		  Referring MD:   Data Reviewed	       Patient is a 64y old  Female who presents with a chief complaint of asthma exacerbation (08 Nov 2019 09:53)      Subjective/HPI     PAST MEDICAL & SURGICAL HISTORY:  HTN (hypertension)  GERD (gastroesophageal reflux disease)  Asthma  Hypothyroid  S/P tubal ligation  S/P cholecystectomy: 1996        Medication list         MEDICATIONS  (STANDING):  budesonide  80 MICROgram(s)/formoterol 4.5 MICROgram(s) Inhaler 2 Puff(s) Inhalation two times a day  enoxaparin Injectable 40 milliGRAM(s) SubCutaneous daily  hydrochlorothiazide 12.5 milliGRAM(s) Oral daily  levothyroxine 75 MICROGram(s) Oral daily  lisinopril 20 milliGRAM(s) Oral daily  montelukast 10 milliGRAM(s) Oral daily  predniSONE   Tablet 20 milliGRAM(s) Oral daily    MEDICATIONS  (PRN):  ALBUTerol    90 MICROgram(s) HFA Inhaler 1 Puff(s) Inhalation three times a day PRN Bronchospasm  guaiFENesin   Syrup  (Sugar-Free) 200 milliGRAM(s) Oral every 6 hours PRN Cough         Vitals log        ICU Vital Signs Last 24 Hrs  T(C): 36.6 (09 Nov 2019 05:19), Max: 37 (08 Nov 2019 20:58)  T(F): 97.8 (09 Nov 2019 05:19), Max: 98.6 (08 Nov 2019 20:58)  HR: 63 (09 Nov 2019 05:19) (63 - 89)  BP: 165/91 (09 Nov 2019 05:19) (142/77 - 165/91)  BP(mean): --  ABP: --  ABP(mean): --  RR: 17 (09 Nov 2019 05:19) (17 - 17)  SpO2: 97% (09 Nov 2019 05:19) (95% - 99%)           Input and Output:  I&O's Detail      Lab Data                        12.2   20.00 )-----------( 334      ( 08 Nov 2019 08:31 )             36.2     11-08    139  |  107  |  17  ----------------------------<  121<H>  3.9   |  26  |  0.83    Ca    9.0      08 Nov 2019 08:31    TPro  8.4<H>  /  Alb  4.2  /  TBili  0.3  /  DBili  x   /  AST  26  /  ALT  36  /  AlkPhos  85  11-07            Review of Systems	      Objective     Physical Examination    heart s1s2  lung dec BS  abd soft      Pertinent Lab findings & Imaging      Back:  NO   Adequate UO     I&O's Detail           Discussed with:     Cultures:	        Radiology

## 2019-11-10 ENCOUNTER — TRANSCRIPTION ENCOUNTER (OUTPATIENT)
Age: 64
End: 2019-11-10

## 2019-11-10 VITALS
TEMPERATURE: 98 F | SYSTOLIC BLOOD PRESSURE: 144 MMHG | OXYGEN SATURATION: 96 % | RESPIRATION RATE: 18 BRPM | DIASTOLIC BLOOD PRESSURE: 86 MMHG | HEART RATE: 82 BPM

## 2019-11-10 PROCEDURE — 82785 ASSAY OF IGE: CPT

## 2019-11-10 PROCEDURE — 94640 AIRWAY INHALATION TREATMENT: CPT

## 2019-11-10 PROCEDURE — 85048 AUTOMATED LEUKOCYTE COUNT: CPT

## 2019-11-10 PROCEDURE — 83036 HEMOGLOBIN GLYCOSYLATED A1C: CPT

## 2019-11-10 PROCEDURE — 85027 COMPLETE CBC AUTOMATED: CPT

## 2019-11-10 PROCEDURE — 80053 COMPREHEN METABOLIC PANEL: CPT

## 2019-11-10 PROCEDURE — 96366 THER/PROPH/DIAG IV INF ADDON: CPT

## 2019-11-10 PROCEDURE — 71046 X-RAY EXAM CHEST 2 VIEWS: CPT

## 2019-11-10 PROCEDURE — 84443 ASSAY THYROID STIM HORMONE: CPT

## 2019-11-10 PROCEDURE — 80061 LIPID PANEL: CPT

## 2019-11-10 PROCEDURE — 99285 EMERGENCY DEPT VISIT HI MDM: CPT | Mod: 25

## 2019-11-10 PROCEDURE — 96365 THER/PROPH/DIAG IV INF INIT: CPT

## 2019-11-10 PROCEDURE — 86803 HEPATITIS C AB TEST: CPT

## 2019-11-10 PROCEDURE — 80048 BASIC METABOLIC PNL TOTAL CA: CPT

## 2019-11-10 PROCEDURE — 93005 ELECTROCARDIOGRAM TRACING: CPT

## 2019-11-10 PROCEDURE — 36415 COLL VENOUS BLD VENIPUNCTURE: CPT

## 2019-11-10 PROCEDURE — 83880 ASSAY OF NATRIURETIC PEPTIDE: CPT

## 2019-11-10 PROCEDURE — 86140 C-REACTIVE PROTEIN: CPT

## 2019-11-10 RX ORDER — ALBUTEROL 90 UG/1
0 AEROSOL, METERED ORAL
Qty: 0 | Refills: 0 | DISCHARGE

## 2019-11-10 RX ADMIN — Medication 12.5 MILLIGRAM(S): at 06:17

## 2019-11-10 RX ADMIN — Medication 20 MILLIGRAM(S): at 06:17

## 2019-11-10 RX ADMIN — Medication 75 MICROGRAM(S): at 06:17

## 2019-11-10 RX ADMIN — LISINOPRIL 20 MILLIGRAM(S): 2.5 TABLET ORAL at 06:17

## 2019-11-10 RX ADMIN — BUDESONIDE AND FORMOTEROL FUMARATE DIHYDRATE 2 PUFF(S): 160; 4.5 AEROSOL RESPIRATORY (INHALATION) at 06:18

## 2019-11-10 NOTE — DISCHARGE NOTE PROVIDER - NSDCMRMEDTOKEN_GEN_ALL_CORE_FT
albuterol 2.5 mg/3 mL (0.083%) inhalation solution: 1 vial via neb every 6 hrs for 1 week, then as needed  benazepril 20 mg oral tablet: 1 tab(s) orally once a day  hydroCHLOROthiazide 12.5 mg oral capsule: 1 cap(s) orally once a day  montelukast 10 mg oral tablet: 1 tab(s) orally once a day  predniSONE 20 mg oral tablet: 1 tab(s) orally once a day  Symbicort 160 mcg-4.5 mcg/inh inhalation aerosol: 2 puff(s) inhaled 2 times a day  Synthroid 75 mcg (0.075 mg) oral tablet: 1 tab(s) orally once a day

## 2019-11-10 NOTE — DISCHARGE NOTE NURSING/CASE MANAGEMENT/SOCIAL WORK - PATIENT PORTAL LINK FT
You can access the FollowMyHealth Patient Portal offered by [facility] by registering at the following website: [email or web address]. By joining [facility]’s FollowMyHealth portal, you will also be able to view your health information using other applications (apps) compatible with our system.

## 2019-11-10 NOTE — PROGRESS NOTE ADULT - SUBJECTIVE AND OBJECTIVE BOX
Date/Time Patient Seen:  		  Referring MD:   Data Reviewed	       Patient is a 64y old  Female who presents with a chief complaint of asthma exacerbation (09 Nov 2019 08:20)      Subjective/HPI     PAST MEDICAL & SURGICAL HISTORY:  HTN (hypertension)  GERD (gastroesophageal reflux disease)  Asthma  Hypothyroid  S/P tubal ligation  S/P cholecystectomy: 1996        Medication list         MEDICATIONS  (STANDING):  budesonide  80 MICROgram(s)/formoterol 4.5 MICROgram(s) Inhaler 2 Puff(s) Inhalation two times a day  enoxaparin Injectable 40 milliGRAM(s) SubCutaneous daily  hydrochlorothiazide 12.5 milliGRAM(s) Oral daily  levothyroxine 75 MICROGram(s) Oral daily  lisinopril 20 milliGRAM(s) Oral daily  montelukast 10 milliGRAM(s) Oral daily  predniSONE   Tablet 20 milliGRAM(s) Oral daily    MEDICATIONS  (PRN):  ALBUTerol    90 MICROgram(s) HFA Inhaler 1 Puff(s) Inhalation three times a day PRN Bronchospasm  guaiFENesin   Syrup  (Sugar-Free) 200 milliGRAM(s) Oral every 6 hours PRN Cough         Vitals log        ICU Vital Signs Last 24 Hrs  T(C): 37 (10 Nov 2019 05:47), Max: 37.1 (09 Nov 2019 20:02)  T(F): 98.6 (10 Nov 2019 05:47), Max: 98.7 (09 Nov 2019 20:02)  HR: 72 (10 Nov 2019 05:47) (71 - 104)  BP: 134/75 (10 Nov 2019 05:47) (111/66 - 178/63)  BP(mean): --  ABP: --  ABP(mean): --  RR: 19 (10 Nov 2019 05:47) (18 - 20)  SpO2: 95% (10 Nov 2019 05:47) (95% - 99%)           Input and Output:  I&O's Detail    09 Nov 2019 07:01  -  10 Nov 2019 07:00  --------------------------------------------------------  IN:    Oral Fluid: 250 mL  Total IN: 250 mL    OUT:  Total OUT: 0 mL    Total NET: 250 mL          Lab Data                        12.1   18.78 )-----------( 326      ( 09 Nov 2019 07:15 )             37.6     11-09    141  |  107  |  18  ----------------------------<  98  4.2   |  30  |  0.85    Ca    8.9      09 Nov 2019 07:15              Review of Systems	      Objective     Physical Examination  heart s1s2  lung dec BS  abd soft        Pertinent Lab findings & Imaging      Nazanin:  NO   Adequate UO     I&O's Detail    09 Nov 2019 07:01  -  10 Nov 2019 07:00  --------------------------------------------------------  IN:    Oral Fluid: 250 mL  Total IN: 250 mL    OUT:  Total OUT: 0 mL    Total NET: 250 mL               Discussed with:     Cultures:	        Radiology

## 2019-11-10 NOTE — DISCHARGE NOTE PROVIDER - HOSPITAL COURSE
sent from PCP's office w severe dyspnea- noted to have severe acute asthma exacerbation.    Improved w nebs, iv steroids

## 2019-11-10 NOTE — PHARMACOTHERAPY INTERVENTION NOTE - COMMENTS
Counseled patient on inhaler technique and use of aerochamber with Symbicort and albuterol inhaler. Provided patient with aerochamber.

## 2019-11-10 NOTE — PROGRESS NOTE ADULT - PROBLEM SELECTOR PLAN 1
asthma  asthma ex  anxiety  overall much better  on singulair, proventil PRN, symbicort and low dose steroids  complete 3 more days of Prednisone 20 mg Daily -   follow up with Dr. Coronado in [place] - Pulmonary - [facility]  on room air  ambulate  feels and looks better  seasonal vaccination  discussed with patient

## 2019-11-10 NOTE — DISCHARGE NOTE PROVIDER - NSDCCPCAREPLAN_GEN_ALL_CORE_FT
PRINCIPAL DISCHARGE DIAGNOSIS  Diagnosis: Asthma exacerbation  Assessment and Plan of Treatment: nebs and meds as directed.  prednisone 20mg daily for 3 more days.  follow up dr Coronado of [facility] in 1-2 weeks

## 2019-11-11 LAB — IGE SERPL-ACNC: 146 KU/L — HIGH

## 2021-08-09 ENCOUNTER — RESULT REVIEW (OUTPATIENT)
Age: 66
End: 2021-08-09

## 2021-11-08 NOTE — PATIENT PROFILE ADULT - ANY IMPAIRED UPPER EXTREMITY FUNCTION WITHIN A WEEK PRIOR TO ADMISSION RELATED TO?
no Dupixent Pregnancy And Lactation Text: This medication likely crosses the placenta but the risk for the fetus is uncertain. This medication is excreted in breast milk.

## 2023-02-27 ENCOUNTER — APPOINTMENT (OUTPATIENT)
Dept: OTOLARYNGOLOGY | Facility: CLINIC | Age: 68
End: 2023-02-27
Payer: COMMERCIAL

## 2023-02-27 VITALS — HEIGHT: 60 IN | WEIGHT: 167 LBS | BODY MASS INDEX: 32.79 KG/M2

## 2023-02-27 DIAGNOSIS — E03.9 HYPOTHYROIDISM, UNSPECIFIED: ICD-10-CM

## 2023-02-27 DIAGNOSIS — I10 ESSENTIAL (PRIMARY) HYPERTENSION: ICD-10-CM

## 2023-02-27 PROCEDURE — 92567 TYMPANOMETRY: CPT

## 2023-02-27 PROCEDURE — 99204 OFFICE O/P NEW MOD 45 MIN: CPT

## 2023-02-27 PROCEDURE — 92557 COMPREHENSIVE HEARING TEST: CPT

## 2023-02-27 PROCEDURE — 92584 ELECTROCOCHLEOGRAPHY: CPT

## 2023-02-27 RX ORDER — BUDESONIDE AND FORMOTEROL FUMARATE DIHYDRATE 80; 4.5 UG/1; UG/1
80-4.5 AEROSOL RESPIRATORY (INHALATION)
Refills: 0 | Status: ACTIVE | COMMUNITY

## 2023-02-27 RX ORDER — ALBUTEROL SULFATE 90 UG/1
108 (90 BASE) INHALANT RESPIRATORY (INHALATION)
Refills: 0 | Status: ACTIVE | COMMUNITY

## 2023-03-15 NOTE — PHYSICAL EXAM
[Midline] : trachea located in midline position [Normal] : no rashes [de-identified] : DNS to L clear rhinorrhea

## 2023-03-15 NOTE — HISTORY OF PRESENT ILLNESS
[de-identified] : 68 yo F with recent sinus infection - 4 weeks - caused vertigo - has pressure in right ear - PMD said OME - was given augmentin 500/125 bid - feels lightheaded no spinning - BP high 165/84 - also feeling nausea - no headaches - clear rhinorrhea - denies purluence of facial pain-

## 2023-03-15 NOTE — DATA REVIEWED
[de-identified] : Hearing is WNL, AU. Excellent WRS, AU. Type A tymps, AU.\par EcoGh: Right 38% Left 29% WNL, AU.

## 2023-04-05 ENCOUNTER — NON-APPOINTMENT (OUTPATIENT)
Age: 68
End: 2023-04-05

## 2023-04-05 RX ORDER — MECLIZINE HYDROCHLORIDE 25 MG/1
25 TABLET ORAL
Qty: 90 | Refills: 0 | Status: ACTIVE | COMMUNITY
Start: 2023-04-05 | End: 1900-01-01

## 2023-06-08 ENCOUNTER — APPOINTMENT (OUTPATIENT)
Dept: NEUROLOGY | Facility: CLINIC | Age: 68
End: 2023-06-08
Payer: COMMERCIAL

## 2023-06-08 DIAGNOSIS — R42 DIZZINESS AND GIDDINESS: ICD-10-CM

## 2023-06-08 PROCEDURE — 99205 OFFICE O/P NEW HI 60 MIN: CPT

## 2023-06-08 RX ORDER — LEVOTHYROXINE SODIUM 0.17 MG/1
TABLET ORAL
Refills: 0 | Status: ACTIVE | COMMUNITY

## 2023-06-08 RX ORDER — AMOXICILLIN AND CLAVULANATE POTASSIUM 500; 125 MG/1; MG/1
500-125 TABLET, FILM COATED ORAL
Refills: 0 | Status: DISCONTINUED | COMMUNITY
End: 2023-06-08

## 2023-06-08 RX ORDER — METHYLPREDNISOLONE 4 MG/1
4 TABLET ORAL
Qty: 1 | Refills: 0 | Status: DISCONTINUED | COMMUNITY
Start: 2023-02-27 | End: 2023-06-08

## 2023-06-08 RX ORDER — BENAZEPRIL HYDROCHLORIDE 20 MG/1
20 TABLET, FILM COATED ORAL
Refills: 0 | Status: ACTIVE | COMMUNITY

## 2023-06-08 NOTE — HISTORY OF PRESENT ILLNESS
[FreeTextEntry1] : COVID in 09/2022 severe symptoms \par COVID VACCINE FULL.\par \par 68 YO LH woman presents today for dizziness. The dizziness started about a year and half ago after she had cataract surgery but now has resolved. The dizziness was described as lightheadedness. It was occurring daily, multiple times a day and would have relief after laying down for fifteen minutes. The dizziness was mostly provoked during activity. The dizziness would not wake her up at night.  Two weeks before the onset of symptoms she did have a sinus infection.She did see ENT in 02/2023 and was prescribed amoxicillin and methylprednisone for a possible right ear infection.  She also traveled to [place] around this time.   She denies associated neurological symptoms including  vision changes, nausea, vomiting, speech difficulty/swallowing, weakness, hearing loss/tinnitus or paresthesias. She denies chest pain, palpitations and shortness of breath.\par \par Of note she had a MRI brain done at [facility]   in [place] on  3/28/23 with an unremarkable impression. She follows with cardiologist Dr. Dr. Yared Mullen. She had a cardiac w/o a couple of months ago. \par \par She had a medical history of asthma, hypothyroidism, HTN. No significant surgical history.She is retired and worked as a . She is  with three children. No former/present use of tobacco. No daily alcohol use. Her brother has Parkinson's disease.

## 2023-06-08 NOTE — PHYSICAL EXAM
[FreeTextEntry1] : Constitutional:  Patient was well-developed, well-nourished and in no acute distress. \par \par Head:  Normocephalic, atraumatic. Tympanic membranes were clear. \par \par Neck:  Supple with full range of motion. \par \par Cardiovascular:  Cardiac rhythm was regular without murmur. There were no carotid bruits. Peripheral pulses were full and symmetric. \par \par Respiratory:  Lungs were clear. \par \par Abdomen:  Soft and nontender. \par \par Spine:  Nontender. \par \par Skin:  There were no rashes. \par \par NEUROLOGICAL EXAMINATION:\par \par Mental Status: Patient was alert and oriented. Speech was fluent. There was no dysarthria. \par \par Cranial Nerves: \par \par II: Visual acuity was 20/ 20 bilaterally with fingercounting. Pupils were equal and reactive. Visual fields were full. \par \par III, IV, VI:  Eye movements were full without nystagmus. \par \par V: Facial sensation was intact. \par \par VII: Facial strength was normal. \par \par VIII: Hearing was equal. \par \par IX, X: Palatal movement was normal. Phonation was normal. \par \par XI: Sternocleidomastoids and trapezii were normal. \par \par XII: Tongue was midline and movements normal. There was no lingual atrophy or fasciculations. \par \par Motor Examination: Muscle bulk, tone and strength were normal. \par \par Sensory Examination: Pinprick, vibration and joint position sense were intact. \par \par Reflexes: DTRs were 2+ throughout. \par \par Plantar Responses: Plantar responses were flexor.  No clonus\par \par Coordination/Cerebellar Function: There was no dysmetria on finger to nose or heel to shin testing. \par \par Gait/Stance: Gait and tandem were normal. Romberg was negative. She was able to balance and walk on her heels and toes without difficulty \par \par Vielka Hallpike: not reproducible\par \par HINTS: not reproducible

## 2023-06-08 NOTE — ASSESSMENT
[FreeTextEntry1] : 66 YO LH woman w/ PMH of  HTN, asthma and hypothyroidism presents with resolved dizziness that she describes as lightheadedness. During this time she was suffering from a sinus infection and had a recent travel to [place]. She was seen by ENT and cardiology without any remarkable findings. MRI brain impression w/o acute findings. Michelle Hallpike, HINTS and orthostatics were not appreciated on exam. \par \par Impression: resolved dizziness possibly in the setting of vestibular neuritis vs vertigo \par \par Plan:\par []metabolic labs \par \par Communication via telephone in place. The patient will call the office if there is any new neurological complaints and will seek emergent evaluation for concerning red flag symptoms discussed.\par \par

## 2023-09-12 NOTE — PATIENT PROFILE ADULT - NSPROMUTANXFEARADDRESSFT_GEN_A_NUR
Render Risk Assessment In Note?: no Additional Notes: Getting xolair with Dr Eng every 6 weeks Detail Level: Simple none

## 2023-10-16 NOTE — ED PROVIDER NOTE - SKIN, MLM
Skin normal color for race, warm, dry and intact. No evidence of rash. Topical Metronidazole Pregnancy And Lactation Text: This medication is Pregnancy Category B and considered safe during pregnancy.  It is also considered safe to use while breastfeeding.

## 2025-07-21 NOTE — H&P ADULT - HISTORY OF PRESENT ILLNESS
65 y/o female PMHx asthma, GERD, hypothyroidism presents to the ED for asthma exacerbation x 2 weeks. using symbicort and rescue inhaler without relief. no fever/chills/productive cough. denies chest pain aside from her typical tightness associated with asthma. many hospitalization for asthma but never been intubated. no sick contacts.        In ED  Vitals /100, RR 15, HR 74, T 97.2  EKG  Labs: WNL  Imaging  CXR: clear lungs [FreeTextEntry2] : right shoulder pain  63 y/o female PMHx asthma, GERD, hypothyroidism presents to the ED for asthma exacerbation x 2 weeks. Pt states that her asthma symptoms have been progressively worsening for the past two weeks. She denies cough, but notes increased GONG over this time. Pt decided to come in today as she could barely walk while at work due to her SOB. Pt has been using symbicort and her rescue inhaler without relief. Pt notes that cold weather and stress trigger her asthma symptoms, and that she has been more stressed than usual. Pt has chest tightness today, but notes that this is no different than her typical chest tightness she gets with asthma. Pt denies fever/chills/productive cough, CP, abd pain, bowel changes, urine changes, muscle weakness, numbness, tingling.  Pt denies any sick contacts or other complaints today.     In ED  Vitals /100, RR 15, HR 74, T 97.2  EKG  Labs: WNL  Imaging  CXR: clear lungs 65 y/o female PMHx asthma, GERD, hypothyroidism presents to the ED for asthma exacerbation x 2 weeks. Pt states that her asthma symptoms have been progressively worsening for the past two weeks. She denies cough, but notes increased GONG over this time. Pt decided to come in today as she could barely walk while at work due to her SOB. Pt has been using symbicort and her rescue inhaler without relief. Pt notes that cold weather and stress trigger her asthma symptoms, and that she has been more stressed than usual. Pt has chest tightness today, but notes that this is no different than her typical chest tightness she gets with asthma. Pt denies fever/chills/productive cough, CP, abd pain, bowel changes, urine changes, muscle weakness, numbness, tingling.  Pt denies any sick contacts or other complaints today.     In ED  Vitals /100, RR 15, HR 74, T 97.2  EKG NSR, LVH, nonspecific T wave abnormality, prolonged QT (487)  Labs: WNL  Imaging  CXR: clear lungs 65 y/o female PMHx asthma, GERD, HTN, hypothyroidism presents to the ED for asthma exacerbation x 2 weeks. Pt states that her asthma symptoms have been progressively worsening for the past two weeks. She denies cough, but notes increased GONG over this time. Pt decided to come in today as she could barely walk while at work due to her SOB. Pt has been using symbicort and her rescue inhaler without relief. Pt notes that cold weather and stress trigger her asthma symptoms, and that she has been more stressed than usual. Pt has chest tightness today, but notes that this is no different than her typical chest tightness she gets with asthma. Pt denies fever/chills/productive cough, CP, abd pain, bowel changes, urine changes, muscle weakness, numbness, tingling.  Pt denies any sick contacts or other complaints today.     In ED  Vitals /100, RR 15, HR 74, T 97.2  EKG NSR, LVH, nonspecific T wave abnormality, prolonged QT (487)  Labs: WNL  Imaging  CXR: clear lungs

## 2025-07-24 NOTE — PATIENT PROFILE ADULT - TRANSPORTATION
There is no other location with a sooner Void trial /NP appt  in time frame - will monitor for cancellations    no